# Patient Record
Sex: FEMALE | Race: WHITE | NOT HISPANIC OR LATINO | Employment: UNEMPLOYED | URBAN - METROPOLITAN AREA
[De-identification: names, ages, dates, MRNs, and addresses within clinical notes are randomized per-mention and may not be internally consistent; named-entity substitution may affect disease eponyms.]

---

## 2021-04-06 DIAGNOSIS — Z23 ENCOUNTER FOR IMMUNIZATION: ICD-10-CM

## 2021-05-19 ENCOUNTER — APPOINTMENT (EMERGENCY)
Dept: CT IMAGING | Facility: HOSPITAL | Age: 31
End: 2021-05-19
Payer: COMMERCIAL

## 2021-05-19 ENCOUNTER — HOSPITAL ENCOUNTER (EMERGENCY)
Facility: HOSPITAL | Age: 31
Discharge: HOME | End: 2021-05-19
Attending: EMERGENCY MEDICINE | Admitting: EMERGENCY MEDICINE
Payer: COMMERCIAL

## 2021-05-19 VITALS
WEIGHT: 108.03 LBS | TEMPERATURE: 98.4 F | HEART RATE: 69 BPM | DIASTOLIC BLOOD PRESSURE: 56 MMHG | SYSTOLIC BLOOD PRESSURE: 104 MMHG | RESPIRATION RATE: 16 BRPM

## 2021-05-19 DIAGNOSIS — S09.90XA INJURY OF HEAD, INITIAL ENCOUNTER: Primary | ICD-10-CM

## 2021-05-19 PROCEDURE — 99284 EMERGENCY DEPT VISIT MOD MDM: CPT

## 2021-05-19 PROCEDURE — 70450 CT HEAD/BRAIN W/O DYE: CPT

## 2021-05-19 PROCEDURE — 99284 EMERGENCY DEPT VISIT MOD MDM: CPT | Performed by: PHYSICIAN ASSISTANT

## 2021-05-19 NOTE — DISCHARGE INSTRUCTIONS
DISCHARGE INSTRUCTIONS:    FOLLOW UP WITH YOUR PRIMARY CARE PROVIDER OR THE [address] [place] [address]  MAKE AN APPOINTMENT TO BE SEEN  TAKE TYLENOL OR MOTRIN FOR PAIN  REST AND DRINK PLENTY OF FLUIDS  FOLLOW UP WITH THE RECOMMENDED CONCUSSION CLINIC  IF SYMPTOMS WORSEN OR NEW SYMPTOMS ARISE, RETURN TO THE ER TO BE SEEN

## 2021-05-19 NOTE — ED PROVIDER NOTES
History  Chief Complaint   Patient presents with    Head Injury     Patient was running this morning when she fell and hit her head  No Loc but she said she was seeing spots and not herself for about 2 minutes      30y  o female with no significant PMH presents to the ER for head injury occurring this morning  Patient states she was running when she tripped and fell hitting the right side of her head on the sidewalk  She denies LOC or blood thinner use  Patient states when she hit her head, she was seeing spots and felt nauseous  She took an NSAID for pain  She currently denies any symptoms including pain  She denies fever, chills, URI symptoms, chest pain, dyspnea, vomiting, diarrhea, abdominal pain, neck pain, back pain, weakness or paresthesias  History provided by:  Patient   used: No        None       History reviewed  No pertinent past medical history  History reviewed  No pertinent surgical history  History reviewed  No pertinent family history  I have reviewed and agree with the history as documented  E-Cigarette/Vaping     E-Cigarette/Vaping Substances     Social History     Tobacco Use    Smoking status: Never Smoker    Smokeless tobacco: Never Used   Substance Use Topics    Alcohol use: Not on file    Drug use: Not on file       Review of Systems   Constitutional: Negative for activity change, appetite change, chills and fever  HENT: Negative for congestion, drooling, ear discharge, ear pain, facial swelling, rhinorrhea and sore throat  Eyes: Positive for visual disturbance (resolved)  Negative for redness  Respiratory: Negative for cough and shortness of breath  Cardiovascular: Negative for chest pain  Gastrointestinal: Positive for nausea (resolved)  Negative for abdominal pain, diarrhea and vomiting  Musculoskeletal: Negative for back pain, neck pain and neck stiffness  Skin: Negative for rash  Allergic/Immunologic: Negative for food allergies  Neurological: Positive for headaches (resolved)  Negative for weakness and numbness  Physical Exam  Physical Exam  Vitals signs and nursing note reviewed  Constitutional:       General: She is not in acute distress  Appearance: She is not toxic-appearing  HENT:      Head: Normocephalic and atraumatic  Right Ear: Tympanic membrane, ear canal and external ear normal  No drainage, swelling or tenderness  No foreign body  No hemotympanum  Tympanic membrane is not erythematous  Left Ear: Tympanic membrane, ear canal and external ear normal  No drainage, swelling or tenderness  No foreign body  No hemotympanum  Tympanic membrane is not erythematous  Eyes:      Extraocular Movements: Extraocular movements intact  Conjunctiva/sclera: Conjunctivae normal       Pupils: Pupils are equal, round, and reactive to light  Neck:      Musculoskeletal: Normal range of motion and neck supple  Trachea: Phonation normal  No tracheal deviation  Cardiovascular:      Rate and Rhythm: Normal rate and regular rhythm  Heart sounds: Normal heart sounds, S1 normal and S2 normal  No murmur  No friction rub  No gallop  Pulmonary:      Effort: Pulmonary effort is normal  No respiratory distress  Breath sounds: Normal breath sounds  No decreased breath sounds, wheezing, rhonchi or rales  Chest:      Chest wall: No tenderness  Abdominal:      General: There is no distension  Musculoskeletal: Normal range of motion  General: No deformity  Cervical back: Normal       Thoracic back: Normal       Lumbar back: Normal    Skin:     General: Skin is warm and dry  Findings: No rash  Neurological:      Mental Status: She is alert  GCS: GCS eye subscore is 4  GCS verbal subscore is 5  GCS motor subscore is 6  Cranial Nerves: No cranial nerve deficit  Sensory: No sensory deficit  Motor: No abnormal muscle tone        Gait: Gait normal          Vital Signs  ED Triage Vitals [05/19/21 0849]   Temperature Pulse Respirations Blood Pressure SpO2   98 4 °F (36 9 °C) 69 16 104/56 --      Temp Source Heart Rate Source Patient Position - Orthostatic VS BP Location FiO2 (%)   Oral Monitor -- -- --      Pain Score       --           Vitals:    05/19/21 0849   BP: 104/56   Pulse: 69         Visual Acuity  Visual Acuity      Most Recent Value   L Pupil Size (mm)  4   R Pupil Size (mm)  4          ED Medications  Medications - No data to display    Diagnostic Studies  Results Reviewed     None                 CT head without contrast   Final Result by Walker Castro MD (05/19 1052)      No acute intracranial abnormality  Workstation performed: [id number]                    Procedures  Procedures         ED Course                             SBIRT 22yo+      Most Recent Value   SBIRT (22 yo +)   In order to provide better care to our patients, we are screening all of our patients for alcohol and drug use  Would it be okay to ask you these screening questions? Yes Filed at: 05/19/2021 0856   Initial Alcohol Screen: US AUDIT-C    1  How often do you have a drink containing alcohol?  0 Filed at: 05/19/2021 0856   2  How many drinks containing alcohol do you have on a typical day you are drinking? 0 Filed at: 05/19/2021 0856   3a  Male UNDER 65: How often do you have five or more drinks on one occasion? 0 Filed at: 05/19/2021 0856   3b  FEMALE Any Age, or MALE 65+: How often do you have 4 or more drinks on one occassion? 0 Filed at: 05/19/2021 0856   Audit-C Score  0 Filed at: 05/19/2021 5559   VIRI: How many times in the past year have you    Used an illegal drug or used a prescription medication for non-medical reasons?   Never Filed at: 05/19/2021 0856                    MDM  Number of Diagnoses or Management Options  Injury of head, initial encounter: new and requires workup  Diagnosis management comments: DDX consists of but not limited to: fracture, contusion, intracranial abnormality    Will check imaging  Offered pain medication but patient declined  Informed patient of imaging findings  Will discharge  Patient agreeable  The management plan was discussed in detail with the patient at bedside and all questions were answered  Prior to discharge, we provided both verbal and written instructions  We discussed with the patient the signs and symptoms for which to return to the emergency department  All questions were answered and patient was comfortable with the plan of care and discharged to home  Instructed the patient to follow up with the primary care provider and/or specialist provided and their written instructions  The patient verbalized understanding of our discussion and plan of care, and agrees to return to the Emergency Department for concerns and progression of illness  At discharge, I instructed the patient to:  -follow up with pcp  -take Tylenol or Motrin for pain  -rest and drink plenty of fluids  -follow up with the recommended concussion clinic  -return to the ER if symptoms worsened or new symptoms arose  Patient agreed to this plan and was stable at time of discharge  Amount and/or Complexity of Data Reviewed  Clinical lab tests: ordered and reviewed    Patient Progress  Patient progress: stable      Disposition  Final diagnoses:   Injury of head, initial encounter     Time reflects when diagnosis was documented in both MDM as applicable and the Disposition within this note     Time User Action Codes Description Comment    5/19/2021 10:56 AM Honey TOLENTINO Add [[id number] [id number]] Injury of head, initial encounter       ED Disposition     ED Disposition Condition Date/Time Comment    Discharge Stable Wed May 19, 2021 10:56 AM Rosalia Jaffe discharge to home/self care              Follow-up Information     Follow up With Specialties Details Why Contact Info Additional [address] Schedule an appointment as soon as possible for a visit   [address], [address] [facility] [id number]  [address], [address], [address] [place], [place], [address]    Physical Therapy at [facility]  Possible concussion Shravan Peter [address]  [phone] Physical Therapy at [facility], [address], [address]          There are no discharge medications for this patient  No discharge procedures on file      PDMP Review     None          ED Provider  Electronically Signed by           Ofelia Grider PA-C  05/19/21 4132